# Patient Record
Sex: MALE | Race: ASIAN | NOT HISPANIC OR LATINO | URBAN - METROPOLITAN AREA
[De-identification: names, ages, dates, MRNs, and addresses within clinical notes are randomized per-mention and may not be internally consistent; named-entity substitution may affect disease eponyms.]

---

## 2019-12-07 ENCOUNTER — EMERGENCY (EMERGENCY)
Facility: HOSPITAL | Age: 49
LOS: 1 days | Discharge: HOME | End: 2019-12-07
Attending: STUDENT IN AN ORGANIZED HEALTH CARE EDUCATION/TRAINING PROGRAM | Admitting: STUDENT IN AN ORGANIZED HEALTH CARE EDUCATION/TRAINING PROGRAM
Payer: MEDICAID

## 2019-12-07 VITALS
SYSTOLIC BLOOD PRESSURE: 130 MMHG | OXYGEN SATURATION: 98 % | HEART RATE: 70 BPM | DIASTOLIC BLOOD PRESSURE: 82 MMHG | TEMPERATURE: 97 F | RESPIRATION RATE: 18 BRPM

## 2019-12-07 LAB
ALBUMIN SERPL ELPH-MCNC: 4.3 G/DL — SIGNIFICANT CHANGE UP (ref 3.3–5)
ALP SERPL-CCNC: 59 U/L — SIGNIFICANT CHANGE UP (ref 40–120)
ALT FLD-CCNC: 24 U/L — SIGNIFICANT CHANGE UP (ref 4–41)
ANION GAP SERPL CALC-SCNC: 14 MMO/L — SIGNIFICANT CHANGE UP (ref 7–14)
AST SERPL-CCNC: 22 U/L — SIGNIFICANT CHANGE UP (ref 4–40)
BASOPHILS # BLD AUTO: 0.04 K/UL — SIGNIFICANT CHANGE UP (ref 0–0.2)
BASOPHILS NFR BLD AUTO: 0.5 % — SIGNIFICANT CHANGE UP (ref 0–2)
BILIRUB SERPL-MCNC: 0.5 MG/DL — SIGNIFICANT CHANGE UP (ref 0.2–1.2)
BUN SERPL-MCNC: 10 MG/DL — SIGNIFICANT CHANGE UP (ref 7–23)
CALCIUM SERPL-MCNC: 9.5 MG/DL — SIGNIFICANT CHANGE UP (ref 8.4–10.5)
CHLORIDE SERPL-SCNC: 98 MMOL/L — SIGNIFICANT CHANGE UP (ref 98–107)
CO2 SERPL-SCNC: 24 MMOL/L — SIGNIFICANT CHANGE UP (ref 22–31)
CREAT SERPL-MCNC: 0.63 MG/DL — SIGNIFICANT CHANGE UP (ref 0.5–1.3)
EOSINOPHIL # BLD AUTO: 0.58 K/UL — HIGH (ref 0–0.5)
EOSINOPHIL NFR BLD AUTO: 6.6 % — HIGH (ref 0–6)
GLUCOSE SERPL-MCNC: 153 MG/DL — HIGH (ref 70–99)
HCT VFR BLD CALC: 41.7 % — SIGNIFICANT CHANGE UP (ref 39–50)
HGB BLD-MCNC: 13.6 G/DL — SIGNIFICANT CHANGE UP (ref 13–17)
IMM GRANULOCYTES NFR BLD AUTO: 0.2 % — SIGNIFICANT CHANGE UP (ref 0–1.5)
LYMPHOCYTES # BLD AUTO: 2.59 K/UL — SIGNIFICANT CHANGE UP (ref 1–3.3)
LYMPHOCYTES # BLD AUTO: 29.3 % — SIGNIFICANT CHANGE UP (ref 13–44)
MCHC RBC-ENTMCNC: 30.5 PG — SIGNIFICANT CHANGE UP (ref 27–34)
MCHC RBC-ENTMCNC: 32.6 % — SIGNIFICANT CHANGE UP (ref 32–36)
MCV RBC AUTO: 93.5 FL — SIGNIFICANT CHANGE UP (ref 80–100)
MONOCYTES # BLD AUTO: 0.52 K/UL — SIGNIFICANT CHANGE UP (ref 0–0.9)
MONOCYTES NFR BLD AUTO: 5.9 % — SIGNIFICANT CHANGE UP (ref 2–14)
NEUTROPHILS # BLD AUTO: 5.1 K/UL — SIGNIFICANT CHANGE UP (ref 1.8–7.4)
NEUTROPHILS NFR BLD AUTO: 57.5 % — SIGNIFICANT CHANGE UP (ref 43–77)
NRBC # FLD: 0 K/UL — SIGNIFICANT CHANGE UP (ref 0–0)
PLATELET # BLD AUTO: 223 K/UL — SIGNIFICANT CHANGE UP (ref 150–400)
PMV BLD: 10.3 FL — SIGNIFICANT CHANGE UP (ref 7–13)
POTASSIUM SERPL-MCNC: 4.6 MMOL/L — SIGNIFICANT CHANGE UP (ref 3.5–5.3)
POTASSIUM SERPL-SCNC: 4.6 MMOL/L — SIGNIFICANT CHANGE UP (ref 3.5–5.3)
PROT SERPL-MCNC: 7.6 G/DL — SIGNIFICANT CHANGE UP (ref 6–8.3)
RBC # BLD: 4.46 M/UL — SIGNIFICANT CHANGE UP (ref 4.2–5.8)
RBC # FLD: 10.6 % — SIGNIFICANT CHANGE UP (ref 10.3–14.5)
SODIUM SERPL-SCNC: 136 MMOL/L — SIGNIFICANT CHANGE UP (ref 135–145)
WBC # BLD: 8.85 K/UL — SIGNIFICANT CHANGE UP (ref 3.8–10.5)
WBC # FLD AUTO: 8.85 K/UL — SIGNIFICANT CHANGE UP (ref 3.8–10.5)

## 2019-12-07 PROCEDURE — 73562 X-RAY EXAM OF KNEE 3: CPT | Mod: 26,RT

## 2019-12-07 PROCEDURE — 99285 EMERGENCY DEPT VISIT HI MDM: CPT

## 2019-12-07 PROCEDURE — 73030 X-RAY EXAM OF SHOULDER: CPT | Mod: 26,LT

## 2019-12-07 PROCEDURE — 73080 X-RAY EXAM OF ELBOW: CPT | Mod: 26,LT

## 2019-12-07 PROCEDURE — 73060 X-RAY EXAM OF HUMERUS: CPT | Mod: 26,LT

## 2019-12-07 PROCEDURE — 71046 X-RAY EXAM CHEST 2 VIEWS: CPT | Mod: 26

## 2019-12-07 PROCEDURE — 73110 X-RAY EXAM OF WRIST: CPT | Mod: 26,LT

## 2019-12-07 PROCEDURE — 73090 X-RAY EXAM OF FOREARM: CPT | Mod: 26,LT

## 2019-12-07 RX ORDER — MORPHINE SULFATE 50 MG/1
4 CAPSULE, EXTENDED RELEASE ORAL ONCE
Refills: 0 | Status: DISCONTINUED | OUTPATIENT
Start: 2019-12-07 | End: 2019-12-07

## 2019-12-07 RX ADMIN — MORPHINE SULFATE 4 MILLIGRAM(S): 50 CAPSULE, EXTENDED RELEASE ORAL at 19:09

## 2019-12-07 NOTE — ED PROVIDER NOTE - ATTENDING CONTRIBUTION TO CARE
JAYESH PruettD: 49M hx dm, htn, p/w diffuse left sided pain after fall off ladder 15 ftto ground yesterday. was seen at [facility] yesterday where xr and ct head were wnl. notes he has been taking motrin/tylenol for pain without significant releif. notes pain worst along left rib cage, worse with deep breathing. on exam pt is uncomfortable due to pain with tenderness along left lateral lower rib cage and luq abd and LUE. no ecchymosis or external signs of trauma. lungs ctab.no midline ctl spine tendnerness ambulatory in ed without issue suspect pain is realyted to bruising/msk injury from fall but given sevre mechanism and s/s concerning for rib fracture will ct chest/abd/pelvis. give left lower rib pain also consider splenic injury though pt not peritonela and no hemodynamic instability at this time. will also check basic labs, xrays of LUE and give pain control.

## 2019-12-07 NOTE — ED PROVIDER NOTE - OBJECTIVE STATEMENT
49M hx of DM HTN HLD presents with a cc L chest pain L shoulder forearm wrist and R knee pain s/p fall off of ladder approx x15 feet, no head trama no LOC recalls entire event been able to ambulate since fall, was seen at OSH for same cc yesterday had XR and CT? told negative sent home on PO pain meds, no pain meds today presents for worsening pain and swelling, +pain w deep inspiration. Denies n/v/f/c/. Denies headache, syncope, lightheadedness, dizziness. Denies chest palpitations, abdominal pain. Denies dysuria, hematuria, hematochezia, BRBPR, tarry stools, diarrhea, constipation.

## 2019-12-07 NOTE — ED PROVIDER NOTE - CLINICAL SUMMARY MEDICAL DECISION MAKING FREE TEXT BOX
Gagandeep PGY3: 49M hx of DM HTN HLD presents with a cc L chest pain L shoulder forearm wrist and R knee pain s/p fall off of ladder approx x15 feet, no head trama no LOC recalls entire event been able to ambulate since fall, was seen at OSH for same cc yesterday had XR and CT? told negative sent home on PO pain meds, no pain meds today presents for worsening pain and swelling, +pain w deep inspiration.  exam vss non toxic extremity and cw findings as above will get ct chest ap xr eval for fx splenic injury pain control reassess

## 2019-12-07 NOTE — ED PROVIDER NOTE - PROGRESS NOTE DETAILS
Aleida Walker M.D: pt notes pain coming back, will redose pain medication. awaiting ct report JUAN Luna: Received signout on pt, imaging normal, pain medication sent to pharmacy, pt given an incentive spirometer and instructed on use.

## 2019-12-07 NOTE — ED PROVIDER NOTE - NSFOLLOWUPINSTRUCTIONS_ED_ALL_ED_FT
Follow up with your primary doctor and an orthopedist. If you do not have an orthopedist call  to schedule an appointment. Take motrin or tylenol initially for pain and take oxycodone for continued or breakthrough pain. Do not drive or use heavy machinery after taking this medication. Advance activity as tolerated.  Continue all previously prescribed medications as directed.  Follow up with your primary care physician in 48-72 hours- bring copies of your results.  Return to the ER for worsening or persistent symptoms, and/or ANY NEW OR CONCERNING SYMPTOMS. This inlcudes but is not limited to numbness, tingling, weakness, worsening or persistent pain, fever, chills, nightsweats or any other symptoms that concern you. If you have issues obtaining follow up, please call: [phone] to obtain a doctor or specialist who takes your insurance in your area.  You may call [phone] to make an appointment with the internal medicine clinic.

## 2019-12-07 NOTE — ED ADULT TRIAGE NOTE - CHIEF COMPLAINT QUOTE
Pt fell yesterday at work off a ladder and went to a local hospital where they did xrays and gave him motrin.  C/O left neck, left shoulder, right knee, and left rib pain that's increased since yesterday

## 2019-12-07 NOTE — ED PROVIDER NOTE - PHYSICAL EXAMINATION
GEN APPEARANCE: WDWN, alert and cooperative, non-toxic appearing and in NAD  HEAD: Atraumatic, normocephalic   EYES: PERRLa, EOMI, vision grossly intact.   EARS: Gross hearing intact.   NOSE: No nasal discharge, no external evidence of epistaxis.   NECK: Supple  CV: RRR, S1S2, no c/r/m/g. No cyanosis or pallor. Extremities warm, well perfused. Cap refill <2 seconds. No bruits.   LUNGS: CTAB. No wheezing. No rales. No rhonchi. No diminished breath sounds.   ABDOMEN: Soft, NTND. No guarding or rebound. No masses.   MSK: Spine appears normal, no spine point tenderness. No CVA ttp. No joint erythema or tenderness. Normal muscular development. Pelvis stable.  EXTREMITIES: No peripheral edema. diffuse LUE pain L wrist swelling midline dorsal prox wrist ttp L chest wall ttp R patella ttp   NEURO: Alert, follows commands. Weight bearing normal. Speech normal. Sensation and motor normal x4 extremities.   SKIN: Normal color for race, warm, dry and intact. No evidence of rash.  PSYCH: Normal mood and affect.

## 2019-12-07 NOTE — ED PROVIDER NOTE - PATIENT PORTAL LINK FT
You can access the FollowMyHealth Patient Portal offered by [facility] by registering at the following website: [email or web address]. By joining [email or web address]’s FollowMyHealth portal, you will also be able to view your health information using other applications (apps) compatible with our system.

## 2019-12-07 NOTE — ED ADULT NURSE NOTE - OBJECTIVE STATEMENT
patient Alert & ox3. patient complains of bodyache,s/p fall from ladder.,pt . evaluated by MD.Placed 20g angiocath rt. AC., labs drawn and sent. patient will be waiting for results, further evaluation and disposition.  made comfortable.will continue to monitor.

## 2019-12-08 VITALS
DIASTOLIC BLOOD PRESSURE: 75 MMHG | TEMPERATURE: 98 F | SYSTOLIC BLOOD PRESSURE: 138 MMHG | OXYGEN SATURATION: 99 % | RESPIRATION RATE: 18 BRPM | HEART RATE: 58 BPM

## 2019-12-08 PROCEDURE — 74177 CT ABD & PELVIS W/CONTRAST: CPT | Mod: 26

## 2019-12-08 PROCEDURE — 71260 CT THORAX DX C+: CPT | Mod: 26

## 2019-12-08 RX ORDER — OXYCODONE HYDROCHLORIDE 5 MG/1
1 TABLET ORAL
Qty: 12 | Refills: 0
Start: 2019-12-08 | End: 2019-12-10

## 2019-12-08 RX ORDER — MORPHINE SULFATE 50 MG/1
4 CAPSULE, EXTENDED RELEASE ORAL ONCE
Refills: 0 | Status: DISCONTINUED | OUTPATIENT
Start: 2019-12-08 | End: 2019-12-08

## 2019-12-08 RX ORDER — OXYCODONE HYDROCHLORIDE 5 MG/1
5 TABLET ORAL ONCE
Refills: 0 | Status: DISCONTINUED | OUTPATIENT
Start: 2019-12-08 | End: 2019-12-08

## 2019-12-08 RX ADMIN — MORPHINE SULFATE 4 MILLIGRAM(S): 50 CAPSULE, EXTENDED RELEASE ORAL at 01:33

## 2019-12-08 RX ADMIN — OXYCODONE HYDROCHLORIDE 5 MILLIGRAM(S): 5 TABLET ORAL at 02:54

## 2019-12-08 RX ADMIN — MORPHINE SULFATE 4 MILLIGRAM(S): 50 CAPSULE, EXTENDED RELEASE ORAL at 01:34

## 2020-04-28 ENCOUNTER — EMERGENCY (EMERGENCY)
Facility: HOSPITAL | Age: 50
LOS: 1 days | Discharge: HOME | End: 2020-04-28
Admitting: EMERGENCY MEDICINE
Payer: MEDICAID

## 2020-04-28 VITALS
DIASTOLIC BLOOD PRESSURE: 89 MMHG | RESPIRATION RATE: 18 BRPM | HEART RATE: 93 BPM | TEMPERATURE: 98 F | OXYGEN SATURATION: 100 % | SYSTOLIC BLOOD PRESSURE: 155 MMHG

## 2020-04-28 PROCEDURE — 99284 EMERGENCY DEPT VISIT MOD MDM: CPT

## 2020-04-28 RX ORDER — KETOROLAC TROMETHAMINE 30 MG/ML
30 SYRINGE (ML) INJECTION ONCE
Refills: 0 | Status: DISCONTINUED | OUTPATIENT
Start: 2020-04-28 | End: 2020-04-28

## 2020-04-28 RX ORDER — ACETAMINOPHEN 500 MG
975 TABLET ORAL ONCE
Refills: 0 | Status: COMPLETED | OUTPATIENT
Start: 2020-04-28 | End: 2020-04-28

## 2020-04-28 RX ADMIN — Medication 30 MILLIGRAM(S): at 23:34

## 2020-04-28 RX ADMIN — Medication 975 MILLIGRAM(S): at 23:34

## 2020-04-28 NOTE — ED PROVIDER NOTE - OBJECTIVE STATEMENT
50 y/o M pmh HTN, DM, HLD c/o L wrist pain s/p fall 2 hrs ago at home. Pt reports he slipped in the kitchen and outstretched his arm. States he did not hit anything else. Did not take any medication for the pain.

## 2020-04-28 NOTE — ED ADULT TRIAGE NOTE - NS ED TRIAGE AVPU SCALE
Alert-The patient is alert, awake and responds to voice. The patient is oriented to time, place, and person. The triage nurse is able to obtain subjective information. Detail Level: Detailed Quality 137: Melanoma: Continuity Of Care - Recall System: Patient information entered into a recall system that includes: target date for the next exam specified AND a process to follow up with patients regarding missed or unscheduled appointments

## 2020-04-28 NOTE — ED ADULT NURSE NOTE - CHIEF COMPLAINT QUOTE
Pt primarily Malay speaking, declines  son translated via phone [phone]. Pt c/o left wrist pain s/p slip and fall in kitchen. Denies any blood thinners. Hx HTN, HLD. +sensation and +movement in extremities.

## 2020-04-28 NOTE — ED ADULT NURSE NOTE - OBJECTIVE STATEMENT
pt c/o lt wrist pain, swelling s/p mechanical fall.  Pt states his leg fell asleep, he got up to walk on it and fell to floor landing on lt forearm and wrist.  Pt denies head trauma, denies loc, denies leg tingling at this time.  Pulses +, moves all extremities.

## 2020-04-28 NOTE — ED PROVIDER NOTE - PATIENT PORTAL LINK FT
You can access the FollowMyHealth Patient Portal offered by [facility] by registering at the following website: [email or web address]. By joining [employer]’s FollowMyHealth portal, you will also be able to view your health information using other applications (apps) compatible with our system.

## 2020-04-28 NOTE — ED ADULT NURSE NOTE - NSIMPLEMENTINTERV_GEN_ALL_ED
Implemented All Fall Risk Interventions:  [place] to call system. Call bell, personal items and telephone within reach. Instruct patient to call for assistance. Room bathroom lighting operational. Non-slip footwear when patient is off stretcher. Physically safe environment: no spills, clutter or unnecessary equipment. Stretcher in lowest position, wheels locked, appropriate side rails in place. Provide visual cue, wrist band, yellow gown, etc. Monitor gait and stability. Monitor for mental status changes and reorient to person, place, and time. Review medications for side effects contributing to fall risk. Reinforce activity limits and safety measures with patient and family.

## 2020-04-28 NOTE — ED PROVIDER NOTE - NSFOLLOWUPINSTRUCTIONS_ED_ALL_ED_FT
Please follow up with orthopedics. See attached sheet for address and phone number.    Advance activity as tolerated.  Continue all previously prescribed medications as directed unless otherwise instructed.  Follow up with your primary care physician in 48-72 hours- bring copies of your results.  Return to the ER for worsening or persistent symptoms, and/or ANY NEW OR CONCERNING SYMPTOMS. If you have issues obtaining follow up, please call: [phone] to obtain a doctor or specialist who takes your insurance in your area.  You may call [phone] to make an appointment with the internal medicine clinic.

## 2020-04-28 NOTE — ED ADULT TRIAGE NOTE - CHIEF COMPLAINT QUOTE
Pt primarily Hebrew speaking, declines  son translated via phone [phone]. Pt c/o left wrist pain s/p slip and fall in kitchen. Denies any blood thinners. Hx HTN, HLD. Pt primarily Spanish speaking, declines  son translated via phone [phone]. Pt c/o left wrist pain s/p slip and fall in kitchen. Denies any blood thinners. Hx HTN, HLD. +sensation and +movement in extremities.

## 2020-04-29 PROBLEM — Z78.9 OTHER SPECIFIED HEALTH STATUS: Chronic | Status: ACTIVE | Noted: 2019-12-07

## 2020-04-29 PROCEDURE — 73110 X-RAY EXAM OF WRIST: CPT | Mod: 26,LT

## 2020-04-29 PROCEDURE — 73090 X-RAY EXAM OF FOREARM: CPT | Mod: 26,LT

## 2020-04-29 RX ORDER — IBUPROFEN 200 MG
1 TABLET ORAL
Qty: 20 | Refills: 0
Start: 2020-04-29

## 2020-04-29 NOTE — ED POST DISCHARGE NOTE - REASON FOR FOLLOW-UP
Other Pharmacy called they don't have Oxycodone 5/300. Switched to 5/325 percocet. Pharmacy cancelled other Rx and ERX percocet  5/325 q6hrs qty 10 MDD 4.

## 2020-04-29 NOTE — CONSULT NOTE ADULT - SUBJECTIVE AND OBJECTIVE BOX
49yMale c/o L wrist pain s/p fall earlier this evening. Patient denies head hit or LOC. Patient denies numbness or tingling in the LUE. Patient denies any other injuries.    PMH:  No pertinent past medical history    PSH:  No significant past surgical history  Patient has history of Left Shoulder ORIF in [facility] 2011    AH:    Meds: See med rec    T(C): 36.9 (04-28-20 @ 22:36)  HR: 93 (04-28-20 @ 22:36)  BP: 155/89 (04-28-20 @ 22:36)  RR: 18 (04-28-20 @ 22:36)  SpO2: 100% (04-28-20 @ 22:36)  Wt(kg): --    Gen: NAD  PE LUE:  Skin intact,   visible deformity of wrist,   SILT med/rad/ulnar/axillary  +AIN/PIN/Ulnar/Radial/Musc/Median,   +shoulder/elbow ROM,   wrist ROM limited 2/2 pain,   +radial pulse, soft compartments,    Secondary:  No TTP over bony landmarks, SILT BL, ROM intact BL, distal pulses palpable.    Imaging:  XR demonstrating Left distal radius fracture    Procedure:  Under aseptic conditions, a hematoma block was administered to the fracture site using 7cc of 1% lidocaine. Closed reduction was performed and a well molded plaster splint was applied. The patient tolerated the procedure well and there we no complications. The patient was neurovascularly intact following reduction. Post-reduction xrays demonstrated acceptable alignment.      49yMale with L distal radius fracture sp reduction and splinting    pain control  NWB in sling  PT/OT  No acute orthopaedic intervention  Please call if you have further questions  Can follow up with Dr. Martinez in 1-2 weeks or upon discharge    Splint precautions:  Keep cast dry  Elevate extremity, can try and ice through the splint  Do not stick anything into the splint  Monitor for signs of pressure build up from swelling: pain not controlled with medications, severe pain when moves the fingers/toes, numbness/tingling      Ortho [id number]

## 2021-09-08 NOTE — ED ADULT TRIAGE NOTE - PRO INTERPRETER NEED 2
Call placed to pt regarding need to update RW eligibility and paperwork. Reviewed all needed documentation for update. This includes proof of income ( 1 month total, most recent) proof of residency and/or ID as well as any insurance updates or changes. pt is aware of the program, verbalized understanding and denies questions. NO CHANGES in the past 6 months    CM apt made 9.16.21 via phone    Discussed upcoming HIV support group. 9.30.21    Pt is unsure about attending due to the increase of COVID. Will think about it and call office if able to attend. Denies any needs at this time. Denies any issues that need Doctor attention.  Will call with concerns
English

## 2022-08-12 NOTE — ED PROVIDER NOTE - RESPIRATORY, MLM
Immediate Brief Procedure Note    Patient: Ambar Haskins    Pre-op Diagnosis: Liver metastases    Post-op Diagnosis: Same    Procedure:  Liver lesion biopsy    Proceduralist: Cate Brown MD    Assistants: None    Anesthesia Staff: No anesthesia staff entered.    Anesthesia Type: Moderate sedation    Findings: 3.5 cm segment 6 lesion targeted for biopsy    Estimated Blood Loss: Minimal    Complications: none    Specimens Removed: Yes  
Breath sounds clear and equal bilaterally.

## 2023-01-04 NOTE — ED PROCEDURE NOTE - PROCEDURE ADDITIONAL DETAILS
No scaphoid tenderness but mild tenderness in medial dorsal aspect of wrist, ace wrap applied without complication
Yes